# Patient Record
(demographics unavailable — no encounter records)

---

## 2024-11-12 NOTE — HISTORY OF PRESENT ILLNESS
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 She was previously seen by Dr. Siddiqui my colleague who has now retired. 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 She saw Dr. 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 There are several genes in this region including potentially PROP1 but sequencing was denied by insurance. Genetic evaluation of the parents showed that Haydee's father carries a balanced translocation of 5q and Yq. Due to concern of MAXX GH was not started. Compared to previous visit when I first met her growth rate was 4.8 cm/year which is reassuring.  She was followed, did home measurement during pandemic and growth appeared reasonable.  I last saw her 3/18/22 for follow-up when she was well. Reportedly breast development since about August 2020. She was following with orthopedist due to 10 degree scoliosis planning every 6 months follow-up. 12/27/2021 bone age: I read to be 13 years of age. She was 14 year 1 month at the time it was done, with SD of 12 months this is normal bone age. She overall had growth velocity of 6.2 cm/year which is reassuring consistent with pubertal growth spurt. I reviewed mood swings generally not due to hormonal concerns. She was already 4' 10.8". Overall felt she did not need to return given good growth, not appearing to have had any true pituitary hormone deficiency and thus likely does not have PROP1 deletion. Mother contacted end of April 2024 concerned about breast asymmetry informed  that is not unusual.  She saw NP Ms. Saunders 5/9/24 for follow-up, when mother reported wanting a "check-up" after 2 years or not having returned. She was already 62.5" inch in height which is reassuring. Still with IEP. She did have irregular menses but no acne or hirsutism. She had menarche Feb 2024 and then cycles in March and none since. She requested full pituitary hormone testing including Am cortisol that returned robust at 12 ug/dL, elevated TSH of 8.23 uIU/mL but repeated early August was almost normal for TSH with normal antibodies and T4. Estradiol and testosterone are normal but she commented LH to FSH may suggest imbalance and risk for PCOS. Recommended keeping menstrual diary. Testosterone and estradiol and SHBG were normal which were reassuring.   Today, mother and Haydee reported they did hold her vitamins or the blood work. She has been overall well. and they have kept track of her menses as recommended. They never did get repeat results for the thyroid studies and enquire what it was.  She had a good birthday.  Today, I asked, confirmed, Feb 3rd, March 2nd then not until June 2nd, and July 11th. August 30, then October 28th.  She voiced that lately it has been towards end of month. Always about 4-5 days.  She has been tired a lot, they stated she should get more sleep they know. She plays soccer and does dance.  I enquired about the borderline MAXX that she had a long time ago, mother reported that she was snoring a lot then. It only happens Haydee states when she does not realize and lies on her stomach. She does not snore much anymore. They feel overall, it is just that she has to go to bed really late because she has been busy with school work.  Maybe would like Marine Biology. Anything with animals she feels like is what she wants to focus on it.  [FreeTextEntry1] : Menarche Feb 2024 still irregular as above

## 2025-01-13 NOTE — ASSESSMENT
[FreeTextEntry1] : This young lady comes today for the chief complaint of right foot fifth metatarsal fracture.   INTERVAL HISTORY:  Haydee comes today for further assessment regarding an injury she had sustained to her foot.  Now it appears as though the injury was about two weeks ago.  Haydee had an inversion-type injury to her foot.  She initially was able to continue to participate in dance and was having some difficulty, some swelling but no obvious ecchymosis.  Based on the fact that her pain and symptoms did not irvin, she was ultimately taken for x-ray imaging where x-rays are now available for review, which indicate evidence of an incomplete fracture of the distal end of the fifth metatarsal shaft.  The patient was managed with crutches in addition to a hard-soled shoe.  She still has some reported difficulty and pain over the lateral border of the foot but reports that she has not sustained any re-injuries.  Date of x-ray imaging was approximately one week ago.   Since the day of the last evaluation, there has been no significant change in past medical or social history.   Review of systems today is negative for fever, chills, chest pain, shortness of breath, or rashes.   PHYSICAL EXAMINATION: On examination today, Haydee is in no apparent distress.  She is pleasant, cooperative and alert.  She is able to ambulate with assistance of a hard-soled shoe.  She has some difficulty and some pain.  She has tenderness to palpation over the distal end of the fifth metatarsal, although there is no crepitus with normal range of motion about the toe and sensation grossly intact to light touch with no evidence of ecchymosis.   X-ray imaging was available for review indicating an incomplete fracture of the distal end of the fifth metatarsal shaft without intra-articular extension.  Repeat films were obtained today, AP, lateral and oblique views, indicating some mild periosteal reaction and healing.  The patient does not have any evidence of obvious displacement.   ASSESSMENT/PLAN: Haydee is a 17-year-old female who sustained a right foot fifth metatarsal shaft fracture, which appears to be in good alignment and already has some evidence of early healing.  Date of the injury is now about two weeks ago.  The patient is doing very well.  I continue to advise on the use of crutches as needed with a hard-soled shoe for at least the next week and then the patient can transition to shoes as she feels comfortable.  I would like to see her back in approximately two weeks for clinical reassessment as well as radiographs, and at that point, we will discuss if she may begin to return to her dance and performance activities.  All questions were answered to satisfaction today.  Haydee's father who act as independent historian given the child's pediatric age expressed understanding and agrees.

## 2025-01-24 NOTE — DATA REVIEWED
[de-identified] : Right foot AP/lateral/oblique Xrays ordered, done and independently reviewed today: Well aligned healing distal fifth metatarsal fracture with callus formation noted.  The fracture line is still visible.

## 2025-01-24 NOTE — REASON FOR VISIT
[Initial Evaluation] : an initial evaluation [Patient] : patient [Father] : father [FreeTextEntry1] : Right foot distal fifth metatarsal fracture sustained 1 month ago on 12/21/2025

## 2025-01-24 NOTE — PHYSICAL EXAM
[FreeTextEntry1] : Pleasant and cooperative with exam, appropriate for age. Ambulates with a subtle right-sided painless limp. AAOX3  Skin: No rashes noted.  Eyes: Both conjunctiva, eyelids and pupils are present.  ENT:  Both ears, nose and lips are present. No nasal congestion.  Resp: No cough or wheezing noted.  Right foot: Full active and passive range of motion with no discomfort.  No ecchymosis erythema or edema noted.  There is no discomfort elicited with palpation over the fifth metatarsal.  Minimal discomfort over the cuboid bone.  4\5 muscle strength.  The ankle joint is stable with stress maneuvers. Neurologically intact with full sensation to palpation. No signs of radiating pain/numbness or tingling noted.  2+ pulses palpated in the extremity. Capillary refill less than 2 seconds in all digits. DTRs are intact.

## 2025-01-24 NOTE — ASSESSMENT
[FreeTextEntry1] : Haydee is a 17-year-old girl who sustained a right foot distal fifth metatarsal fracture 1 month ago on 12/21/2025. Today's assessment was performed with the assistance of the patient's parent as an independent historian as the patient's history is unreliable. The radiographs obtained today were reviewed with both the parent and patient confirming a well aligned healing right 5th metatarsal fracture.  The recommendation at this time would be to transition to regular shoes and gradually return to all activities in 2 weeks. At this time no further orthopedic intervention is warranted at this time. The patient's family may contact the office if there are any other concerns. The patient may follow up on a PRN basis is she continues to experience pain.  We had a thorough talk in regard to the diagnosis, prognosis and treatment modalities.  All questions and concerns were addressed today. There was a verbal understanding from the parents and patient.   JAZMYNE Lima have acted as a scribe and documented the above information for Dr. Lazcnao.   This note was generated using Dragon medical dictation software. A reasonable effort has been made for proofreading its contents; however, typos may still remain. If there are any questions or points of clarification needed, please do not hesitate to contact my office. The above documentation completed by the scribe is an accurate record of both my words and actions.  JPD    The above documentation completed by the scribe is an accurate record of both my words and actions.   Dr. Neno cruz

## 2025-01-24 NOTE — HISTORY OF PRESENT ILLNESS
[FreeTextEntry1] : Haydee is a 17-year-old girl who sustained a right foot distal fifth metatarsal fracture 1 month ago on 12/21/2025.  She is currently wearing her hard soled shoe.  Her pain has moderately subsided.  She continues to have residual mild discomfort.  She admits to walking without the [place] shoe at home at times.  She presents today for pediatric orthopedic follow-up exam and x-rays.